# Patient Record
Sex: MALE | URBAN - METROPOLITAN AREA
[De-identification: names, ages, dates, MRNs, and addresses within clinical notes are randomized per-mention and may not be internally consistent; named-entity substitution may affect disease eponyms.]

---

## 2021-06-28 ENCOUNTER — OFFICE VISIT (OUTPATIENT)
Dept: URBAN - METROPOLITAN AREA CLINIC 118 | Facility: CLINIC | Age: 31
End: 2021-06-28

## 2021-07-22 ENCOUNTER — OFFICE VISIT (OUTPATIENT)
Dept: URBAN - METROPOLITAN AREA CLINIC 98 | Facility: CLINIC | Age: 31
End: 2021-07-22
Payer: COMMERCIAL

## 2021-07-22 ENCOUNTER — DASHBOARD ENCOUNTERS (OUTPATIENT)
Age: 31
End: 2021-07-22

## 2021-07-22 VITALS
HEART RATE: 56 BPM | SYSTOLIC BLOOD PRESSURE: 124 MMHG | HEIGHT: 66 IN | BODY MASS INDEX: 22.73 KG/M2 | WEIGHT: 141.4 LBS | DIASTOLIC BLOOD PRESSURE: 77 MMHG | TEMPERATURE: 97.7 F

## 2021-07-22 DIAGNOSIS — K62.5 BRBPR (BRIGHT RED BLOOD PER RECTUM): ICD-10-CM

## 2021-07-22 DIAGNOSIS — K64.8 INTERNAL HEMORRHOID: ICD-10-CM

## 2021-07-22 PROCEDURE — 99202 OFFICE O/P NEW SF 15 MIN: CPT | Performed by: INTERNAL MEDICINE

## 2021-07-22 NOTE — PHYSICAL EXAM GASTROINTESTINAL
Abdomen , soft, nontender, nondistended , no guarding or rigidity , no masses palpable , normal bowel sounds , Liver and Spleen , no hepatomegaly present , no hepatosplenomegaly , liver nontender , spleen not palpable , Rectal , normal sphincter tone , internal hemorrhoid palpable